# Patient Record
Sex: FEMALE | Race: OTHER | URBAN - METROPOLITAN AREA
[De-identification: names, ages, dates, MRNs, and addresses within clinical notes are randomized per-mention and may not be internally consistent; named-entity substitution may affect disease eponyms.]

---

## 2019-11-17 ENCOUNTER — EMERGENCY (EMERGENCY)
Facility: HOSPITAL | Age: 34
LOS: 1 days | Discharge: HOME | End: 2019-11-17
Admitting: EMERGENCY MEDICINE
Payer: MEDICAID

## 2019-11-17 VITALS
HEART RATE: 101 BPM | SYSTOLIC BLOOD PRESSURE: 126 MMHG | TEMPERATURE: 98 F | DIASTOLIC BLOOD PRESSURE: 83 MMHG | RESPIRATION RATE: 18 BRPM | OXYGEN SATURATION: 98 %

## 2019-11-17 PROCEDURE — 99283 EMERGENCY DEPT VISIT LOW MDM: CPT

## 2019-11-17 RX ORDER — AZITHROMYCIN 500 MG/1
1 TABLET, FILM COATED ORAL
Qty: 4 | Refills: 0
Start: 2019-11-17 | End: 2019-11-20

## 2019-11-17 RX ORDER — AZITHROMYCIN 500 MG/1
500 TABLET, FILM COATED ORAL ONCE
Refills: 0 | Status: COMPLETED | OUTPATIENT
Start: 2019-11-17 | End: 2019-11-17

## 2019-11-17 RX ADMIN — AZITHROMYCIN 500 MILLIGRAM(S): 500 TABLET, FILM COATED ORAL at 21:48

## 2019-11-17 RX ADMIN — Medication 100 MILLIGRAM(S): at 21:47

## 2019-11-17 RX ADMIN — Medication 50 MILLIGRAM(S): at 21:48

## 2019-11-17 NOTE — ED PROVIDER NOTE - NSFOLLOWUPINSTRUCTIONS_ED_ALL_ED_FT
1)  PLEASE FOLLOW-UP WITH YOUR PRIMARY CARE DOCTOR OR REFERRED SPECIALIST IN 1-2 DAYS.     2)  BRING ALL PAPERWORK FROM TODAY'S EMERGENCY ROOM  VISIT TO YOUR FOLLOW-UP VISIT.  IF YOU DO NOT HAVE A PRIMARY CARE DOCTOR PLEASE REFER TO THE OFFICE/CLINIC INFORMATION GIVEN ABOVE.  YOU MAY ALSO CALL [phone] AND ASK FOR MS. AUSTIN CHURCH.  SHE CAN HELP YOU MAKE A FOLLOW-UP APPOINTMENT.  HER HOURS ARE 11AM-7PM MONDAY - FRIDAY.    3) PLEASE RETURN TO THE ER IMMEDIATELY OR CALL 911 FOR ANY HIGH FEVER, TROUBLE BREATHING, CHEST PAIN, WEAKNESS, VOMITING, SEVERE PAIN, OR ANY OTHER CONCERNS.

## 2019-11-17 NOTE — ED ADULT NURSE NOTE - OBJECTIVE STATEMENT
35 yo F c.o cough. Pt states "I was diagnosed with Bronchitis on Thursday and they told me to take Mucinex but I still have green to yellow phlegm and now when I cough my chest hurts". Pt has clear lung sounds noted at this time. and afebrile. Pt denies N/v/d/f/chills.

## 2019-11-17 NOTE — ED PROVIDER NOTE - PATIENT PORTAL LINK FT
You can access the FollowMyHealth Patient Portal offered by [facility] by registering at the following website: [email or web address]. By joining [employer]’s FollowMyHealth portal, you will also be able to view your health information using other applications (apps) compatible with our system.

## 2019-11-17 NOTE — ED ADULT NURSE NOTE - NSIMPLEMENTINTERV_GEN_ALL_ED
Implemented All Universal Safety Interventions:  [place] to call system. Call bell, personal items and telephone within reach. Instruct patient to call for assistance. Room bathroom lighting operational. Non-slip footwear when patient is off stretcher. Physically safe environment: no spills, clutter or unnecessary equipment. Stretcher in lowest position, wheels locked, appropriate side rails in place.

## 2019-11-17 NOTE — ED PROVIDER NOTE - CLINICAL SUMMARY MEDICAL DECISION MAKING FREE TEXT BOX
bronchitis, well appearing. lungs clear. will cover with abx/steroids.  given referral for psych follow up at [place].

## 2019-11-17 NOTE — ED PROVIDER NOTE - OBJECTIVE STATEMENT
34 year old female with no PMhx, presents with cough/congestion x one week. reports productive cough.   Denies SOB, CP, palpitations, wheezing, abdominal pain, N/V/D/C, change in urinary/bowel function, dysuria, hematuria, flank pain, malaise, rash, HA, and dizziness.  No recent travel or sick contact noted.

## 2019-11-23 DIAGNOSIS — R05 COUGH: ICD-10-CM

## 2019-11-23 DIAGNOSIS — F17.200 NICOTINE DEPENDENCE, UNSPECIFIED, UNCOMPLICATED: ICD-10-CM

## 2019-11-23 DIAGNOSIS — J40 BRONCHITIS, NOT SPECIFIED AS ACUTE OR CHRONIC: ICD-10-CM

## 2024-09-05 NOTE — ED ADULT NURSE NOTE - CAS EDN INTEG ASSESS
